# Patient Record
Sex: FEMALE | Race: WHITE | NOT HISPANIC OR LATINO | Employment: UNEMPLOYED | URBAN - METROPOLITAN AREA
[De-identification: names, ages, dates, MRNs, and addresses within clinical notes are randomized per-mention and may not be internally consistent; named-entity substitution may affect disease eponyms.]

---
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
## 2025-02-10 ENCOUNTER — NURSE TRIAGE (OUTPATIENT)
Dept: CALL CENTER | Facility: HOSPITAL | Age: 20
End: 2025-02-10
Payer: MEDICAID

## 2025-02-10 NOTE — TELEPHONE ENCOUNTER
Reason for Disposition  • SEVERE vaginal bleeding (e.g., soaking 2 pads or tampons per hour and present 2 or more hours; 1 menstrual cup every 2 hours)    Additional Information  • Negative: Sounds like a life-threatening emergency to the triager  • Negative: Followed a genital area injury (e.g., vagina, vulva)  • Negative: Foreign body in vagina (e.g., tampon)  • Negative: Vaginal discharge is main symptom  • Negative: Pain or burning with passing urine (urination) is main symptom  • Negative: Menstrual cramps is main symptom  • Negative: Abdomen pain is main symptom  • Negative: Pubic lice suspected  • Negative: Itching or rash of female genital area (e.g., labia, vagina, vulva)  • Negative: Pregnant and labor suspected  • Negative: Patient sounds very sick or weak to the triager  • Negative: [1] SEVERE pain AND [2] not improved 2 hours after pain medicine  • Negative: [1] Genital area looks infected (e.g., draining sore, spreading redness) AND [2] fever  • Negative: [1] Something is hanging out of the vagina AND [2] can't easily be pushed back inside  • Vaginal bleeding is main symptom  • Negative: Shock suspected (e.g., cold/pale/clammy skin, too weak to stand, low BP, rapid pulse)  • Negative: Difficult to awaken or acting confused (e.g., disoriented, slurred speech)  • Negative: Passed out (i.e., lost consciousness, collapsed and was not responding)  • Negative: Sounds like a life-threatening emergency to the triager  • Negative: Followed a genital area injury (e.g., vagina, vulva)  • Negative: Pregnant 20 or more weeks  • Negative: Pregnant < 20 weeks  (less than 5 months)  • Negative: Postpartum (from 0 to 6 weeks after delivery)  • Negative: Bleeding occurring > 12 months after menopause  • Negative: Bleeding from sexual abuse or rape  • Negative: [1] Vaginal discharge is main symptom AND [2] small amount of blood  • Negative: SEVERE abdominal pain  • Negative: SEVERE dizziness (e.g., unable to stand,  "requires support to walk, feels like passing out now)    Answer Assessment - Initial Assessment Questions  1. SYMPTOM: \"What's the main symptom you're concerned about?\" (e.g., pain, itching, dryness)      bleding  2. LOCATION: \"Where is the   located?\" (e.g., inside/outside, left/right)      vaginal  3. ONSET: \"When did the    start?\"      8/9 days ago  4. PAIN: \"Is there any pain?\" If Yes, ask: \"How bad is it?\" (Scale: 1-10; mild, moderate, severe)    -  MILD (1-3): Doesn't interfere with normal activities.     -  MODERATE (4-7): Interferes with normal activities (e.g., work or school) or awakens from sleep.      -  SEVERE (8-10): Excruciating pain, unable to do any normal activities.      denies  5. ITCHING: \"Is there any itching?\" If Yes, ask: \"How bad is it?\" (Scale: 1-10; mild, moderate, severe)      denies  6. CAUSE: \"What do you think is causing the discharge?\" \"Have you had the same problem before? What happened then?\"      On period but still having to change tampon every hours and it on 8 days of period  7. OTHER SYMPTOMS: \"Do you have any other symptoms?\" (e.g., fever, itching, vaginal bleeding, pain with urination, injury to genital area, vaginal foreign body)      na  8. PREGNANCY: \"Is there any chance you are pregnant?\" \"When was your last menstrual period?\"      denies    Answer Assessment - Initial Assessment Questions  1. AMOUNT: \"Describe the bleeding that you are having.\"     - SPOTTING: spotting, or pinkish / brownish mucous discharge; does not fill panty liner or pad     - MILD:  less than 1 pad / hour; less than patient's usual menstrual bleeding    - MODERATE: 1-2 pads / hour; 1 menstrual cup every 6 hours; small-medium blood clots (e.g., pea, grape, small coin)    - SEVERE: soaking 2 or more pads/hour for 2 or more hours; 1 menstrual cup every 2 hours; bleeding not contained by pads or continuous red blood from vagina; large blood clots (e.g., golf ball, large coin)       severe  2. ONSET: " "\"When did the bleeding begin?\" \"Is it continuing now?\"      8/9 days ago  3. MENSTRUAL PERIOD: \"When was the last normal menstrual period?\" \"How is this different than your period?\"      Last month  4. REGULARITY: \"How regular are your periods?\"      yes  5. ABDOMEN PAIN: \"Do you have any pain?\" \"How bad is the pain?\"  (e.g., Scale 1-10; mild, moderate, or severe)    - MILD (1-3): doesn't interfere with normal activities, abdomen soft and not tender to touch     - MODERATE (4-7): interferes with normal activities or awakens from sleep, abdomen tender to touch     - SEVERE (8-10): excruciating pain, doubled over, unable to do any normal activities       Mild to moderate  6. PREGNANCY: \"Is there any chance you are pregnant?\" \"When was your last menstrual period?\"      denies  7. BREASTFEEDING: \"Are you breastfeeding?\"      na  8. HORMONE MEDICINES: \"Are you taking any hormone medicines, prescription or over-the-counter?\" (e.g., birth control pills, estrogen)      na  9. BLOOD THINNER MEDICINES: \"Do you take any blood thinners?\" (e.g., Coumadin / warfarin, Pradaxa / dabigatran, aspirin)      na  10. CAUSE: \"What do you think is causing the bleeding?\" (e.g., recent gyn surgery, recent gyn procedure; known bleeding disorder, cervical cancer, polycystic ovarian disease, fibroids)          unknown  11. HEMODYNAMIC STATUS: \"Are you weak or feeling lightheaded?\" If Yes, ask: \"Can you stand and walk normally?\"         yes  12. OTHER SYMPTOMS: \"What other symptoms are you having with the bleeding?\" (e.g., passed tissue, vaginal discharge, fever, menstrual-type cramps)        Bleeding    Protocols used: Vaginal Symptoms-ADULT-AH, Vaginal Bleeding - Abnormal-ADULT-AH    "